# Patient Record
Sex: FEMALE | Race: BLACK OR AFRICAN AMERICAN | NOT HISPANIC OR LATINO | Employment: UNEMPLOYED | URBAN - METROPOLITAN AREA
[De-identification: names, ages, dates, MRNs, and addresses within clinical notes are randomized per-mention and may not be internally consistent; named-entity substitution may affect disease eponyms.]

---

## 2022-05-22 ENCOUNTER — HOSPITAL ENCOUNTER (EMERGENCY)
Facility: HOSPITAL | Age: 22
Discharge: HOME | End: 2022-05-23
Attending: INTERNAL MEDICINE

## 2022-05-22 DIAGNOSIS — R51.9 NONINTRACTABLE HEADACHE, UNSPECIFIED CHRONICITY PATTERN, UNSPECIFIED HEADACHE TYPE: ICD-10-CM

## 2022-05-22 DIAGNOSIS — R50.9 FEVER, UNSPECIFIED FEVER CAUSE: ICD-10-CM

## 2022-05-22 DIAGNOSIS — U07.1 COVID-19: Primary | ICD-10-CM

## 2022-05-22 LAB
AMPHET UR QL SCN: NEGATIVE
APPEARANCE UR: ABNORMAL
B-HCG SERPL QL: NEGATIVE
BACTERIA #/AREA URNS AUTO: ABNORMAL /HPF
BARBITURATE SCN PRESENT UR: NEGATIVE
BENZODIAZ UR QL SCN: NEGATIVE
BILIRUB UR QL STRIP.AUTO: NEGATIVE MG/DL
CANNABINOIDS UR QL SCN: NEGATIVE
COCAINE UR QL SCN: NEGATIVE
COLOR UR AUTO: ABNORMAL
FENTANYL UR QL SCN: NEGATIVE
FLUAV AG UPPER RESP QL IA.RAPID: NOT DETECTED
FLUBV AG UPPER RESP QL IA.RAPID: NOT DETECTED
GLUCOSE UR QL STRIP.AUTO: NEGATIVE MG/DL
KETONES UR QL STRIP.AUTO: 15 MG/DL
LEUKOCYTE ESTERASE UR QL STRIP.AUTO: NEGATIVE UNIT/L
MDMA UR QL SCN: NEGATIVE
MUCOUS THREADS URNS QL MICRO: ABNORMAL /LPF
NITRITE UR QL STRIP.AUTO: POSITIVE
OPIATES UR QL SCN: NEGATIVE
PCP UR QL: NEGATIVE
PH UR STRIP.AUTO: 7 [PH]
PH UR: 7 [PH] (ref 3–11)
PROT UR QL STRIP.AUTO: NEGATIVE MG/DL
RBC #/AREA URNS AUTO: ABNORMAL /HPF
RBC UR QL AUTO: NEGATIVE UNIT/L
RSV A 5' UTR RNA NPH QL NAA+PROBE: NOT DETECTED
SARS-COV-2 RNA RESP QL NAA+PROBE: DETECTED
SP GR UR STRIP.AUTO: >=1.03
SPECIFIC GRAVITY, URINE AUTO (.000) (OHS): >=1.03 (ref 1–1.03)
SQUAMOUS #/AREA URNS AUTO: ABNORMAL /LPF
STREP A PCR (OHS): NOT DETECTED
UROBILINOGEN UR STRIP-ACNC: 1 MG/DL
WBC #/AREA URNS AUTO: ABNORMAL /HPF

## 2022-05-22 PROCEDURE — 87631 RESP VIRUS 3-5 TARGETS: CPT | Performed by: INTERNAL MEDICINE

## 2022-05-22 PROCEDURE — 80307 DRUG TEST PRSMV CHEM ANLYZR: CPT | Performed by: INTERNAL MEDICINE

## 2022-05-22 PROCEDURE — 81025 URINE PREGNANCY TEST: CPT | Performed by: INTERNAL MEDICINE

## 2022-05-22 PROCEDURE — 25000003 PHARM REV CODE 250: Performed by: INTERNAL MEDICINE

## 2022-05-22 PROCEDURE — 81001 URINALYSIS AUTO W/SCOPE: CPT | Performed by: INTERNAL MEDICINE

## 2022-05-22 PROCEDURE — 87636 SARSCOV2 & INF A&B AMP PRB: CPT | Performed by: INTERNAL MEDICINE

## 2022-05-22 PROCEDURE — 99283 EMERGENCY DEPT VISIT LOW MDM: CPT | Mod: 25

## 2022-05-22 RX ORDER — ACETAMINOPHEN 325 MG/1
650 TABLET ORAL
Status: COMPLETED | OUTPATIENT
Start: 2022-05-22 | End: 2022-05-22

## 2022-05-22 RX ADMIN — ACETAMINOPHEN 650 MG: 325 TABLET ORAL at 10:05

## 2022-05-23 VITALS
HEIGHT: 71 IN | SYSTOLIC BLOOD PRESSURE: 128 MMHG | DIASTOLIC BLOOD PRESSURE: 84 MMHG | WEIGHT: 165 LBS | HEART RATE: 87 BPM | TEMPERATURE: 101 F | OXYGEN SATURATION: 97 % | BODY MASS INDEX: 23.1 KG/M2 | RESPIRATION RATE: 18 BRPM

## 2022-05-23 NOTE — ED PROVIDER NOTES
Encounter Date: 5/22/2022       History     Chief Complaint   Patient presents with    Headache    Generalized Body Aches     Symptoms started today. No medication taken at home.      22-year-old black female came into the emergency department after fever and body aches for about 1 hour patient did not take any meds at home just came straight to the emergency department.  Patient works at a [employer] she has not traveled out of town and denies any recent sick contacts        Review of patient's allergies indicates:   Allergen Reactions    Bactrim [sulfamethoxazole-trimethoprim]     Blueberry      No past medical history on file.  Past Surgical History:   Procedure Laterality Date    URINARY SURGERY       No family history on file.  Social History     Substance Use Topics    Alcohol use: Yes     Comment: occasional    Drug use: Never     Review of Systems   Constitutional: Positive for chills, fatigue and fever. Negative for activity change, appetite change, diaphoresis and unexpected weight change.   HENT: Positive for postnasal drip and rhinorrhea. Negative for congestion, dental problem, drooling, ear discharge, ear pain, facial swelling, hearing loss, mouth sores, nosebleeds, sinus pressure, sinus pain, sneezing, sore throat, tinnitus, trouble swallowing and voice change.    Eyes: Negative.  Negative for photophobia, pain, discharge, redness, itching and visual disturbance.   Respiratory: Negative.  Negative for apnea, cough, choking, chest tightness, shortness of breath, wheezing and stridor.    Cardiovascular: Negative.  Negative for chest pain, palpitations and leg swelling.   Gastrointestinal: Negative.  Negative for abdominal distention, abdominal pain, anal bleeding, blood in stool, constipation, diarrhea, nausea, rectal pain and vomiting.   Endocrine: Negative.  Negative for cold intolerance, heat intolerance, polydipsia, polyphagia and polyuria.   Genitourinary: Negative.  Negative for  decreased urine volume, difficulty urinating, dyspareunia, dysuria, enuresis, flank pain, frequency, genital sores, hematuria, menstrual problem, pelvic pain, urgency, vaginal bleeding, vaginal discharge and vaginal pain.   Musculoskeletal: Negative.  Negative for arthralgias, back pain, gait problem, joint swelling, myalgias, neck pain and neck stiffness.   Skin: Negative.  Negative for color change, pallor, rash and wound.   Allergic/Immunologic: Negative.  Negative for environmental allergies, food allergies and immunocompromised state.   Neurological: Positive for light-headedness and headaches. Negative for dizziness, tremors, seizures, syncope, facial asymmetry, speech difficulty, weakness and numbness.   Hematological: Negative.  Negative for adenopathy. Does not bruise/bleed easily.   Psychiatric/Behavioral: Negative.  Negative for agitation, behavioral problems, confusion, decreased concentration, dysphoric mood, hallucinations, self-injury, sleep disturbance and suicidal ideas. The patient is not nervous/anxious and is not hyperactive.    All other systems reviewed and are negative.      Physical Exam     Initial Vitals [05/22/22 2121]   BP Pulse Resp Temp SpO2   136/87 110 16 (!) 100.6 °F (38.1 °C) --      MAP       --         Physical Exam    Nursing note and vitals reviewed.  Constitutional: She appears well-developed and well-nourished. She is not diaphoretic. No distress.   HENT:   Head: Normocephalic and atraumatic.   Mouth/Throat: Oropharynx is clear and moist. No oropharyngeal exudate.   Eyes: Conjunctivae and EOM are normal. Pupils are equal, round, and reactive to light. No scleral icterus.   Neck: Neck supple. No JVD present.   Normal range of motion.  Cardiovascular: Normal rate, regular rhythm, normal heart sounds and intact distal pulses. Exam reveals no gallop and no friction rub.    No murmur heard.  Pulmonary/Chest: Breath sounds normal. No respiratory distress. She has no wheezes. She  exhibits no tenderness.   Abdominal: Abdomen is soft. Bowel sounds are normal. She exhibits no distension. There is no abdominal tenderness. There is no rebound.   Musculoskeletal:         General: Normal range of motion.      Cervical back: Normal range of motion and neck supple.     Neurological: She is alert and oriented to person, place, and time. She has normal strength and normal reflexes.   Skin: Skin is warm and dry. Capillary refill takes less than 2 seconds.   Psychiatric: She has a normal mood and affect. Her behavior is normal. Judgment and thought content normal.         ED Course   Procedures   Admission on 05/22/2022   Component Date Value Ref Range Status    Influenza A PCR 05/22/2022 Not Detected  Not Detected Final    Influenza B PCR 05/22/2022 Not Detected  Not Detected Final    Respiratory Syncytial Virus PCR 05/22/2022 Not Detected  Not Detected Final    SARS-CoV-2 PCR 05/22/2022 Detected (A) Not Detected Final    STREP A PCR ([facility]) 05/22/2022 Not Detected   Final    Color, UA 05/22/2022 Dark Yellow (A) Yellow, Colorless, Other, Clear Final    Appearance, UA 05/22/2022 Cloudy (A) Clear Final    Specific Gravity, UA 05/22/2022 >=1.030   Final    pH, UA 05/22/2022 7.0  5.0, 5.5, 6.0, 6.5, 7.0, 7.5, 8.0, 8.5 Final    Protein, UA 05/22/2022 Negative  Negative, 300  mg/dL Final    Glucose, UA 05/22/2022 Negative  Negative, Normal mg/dL Final    Ketones, UA 05/22/2022 15  (A) Negative, +1, +2, +3, +4, +5, >=160 mg/dL Final    Blood, UA 05/22/2022 Negative  Negative unit/L Final    Bilirubin, UA 05/22/2022 Negative  Negative mg/dL Final    Urobilinogen, UA 05/22/2022 1.0  0.2, 1.0, Normal mg/dL Final    Nitrites, UA 05/22/2022 Positive (A) Negative Final    Leukocyte Esterase, UA 05/22/2022 Negative  Negative, 75  unit/L Final    Amphetamines, Urine 05/22/2022 Negative  Negative Final    Barbituates, Urine 05/22/2022 Negative  Negative Final    Benzodiazepine, Urine 05/22/2022  Negative  Negative Final    Cannabinoids, Urine 05/22/2022 Negative  Negative Final    Cocaine, Urine 05/22/2022 Negative  Negative Final    Fentanyl, Urine 05/22/2022 Negative  Negative Final    Mdma, Urine 05/22/2022 Negative  Negative Final    Opiates, Urine 05/22/2022 Negative  Negative Final    Phencyclidine, Urine 05/22/2022 Negative  Negative Final    pH, Urine 05/22/2022 7.0  3.0 - 11.0 Final    Specific Gravity, Urine Auto 05/22/2022 >=1.030  1.001 - 1.035 Final    Beta hCG Qualitative, Urine 05/22/2022 Negative  Negative Final    Bacteria, UA 05/22/2022 Many (A) None Seen, Rare, Occasional /HPF Final    Mucous, UA 05/22/2022 Small (A) None Seen /LPF Final    RBC, UA 05/22/2022 0-2  None Seen, 0-2, 3-5, 0-5 /HPF Final    WBC, UA 05/22/2022 6-10 (A) None Seen, 0-2, 3-5, 0-5 /HPF Final    Squamous Epithelial Cells, UA 05/22/2022 Few (A) None Seen, Rare, Occasional, Occ /LPF Final       Labs Reviewed   COVID/RSV/FLU A&B PCR - Abnormal; Notable for the following components:       Result Value    SARS-CoV-2 PCR Detected (*)     All other components within normal limits   URINALYSIS, REFLEX TO URINE CULTURE - Abnormal; Notable for the following components:    Color, UA Dark Yellow (*)     Appearance, UA Cloudy (*)     Ketones, UA 15  (*)     Nitrites, UA Positive (*)     All other components within normal limits   URINALYSIS, MICROSCOPIC - Abnormal; Notable for the following components:    Bacteria, UA Many (*)     Mucous, UA Small (*)     WBC, UA 6-10 (*)     Squamous Epithelial Cells, UA Few (*)     All other components within normal limits   DRUG SCREEN PANEL, URINE EMERGENCY - Normal   HCG QUALITATIVE URINE - Normal   STREP GROUP A BY PCR          Imaging Results    None          Medications   acetaminophen tablet 650 mg (650 mg Oral Given 5/22/22 2247)                          Clinical Impression:   Final diagnoses:  [U07.1] COVID-19 (Primary)  [R50.9] Fever, unspecified fever cause  [R51.9]  Nonintractable headache, unspecified chronicity pattern, unspecified headache type       Yamini Watson is a certified MA and was present during the entire interaction with this patient     ED Disposition Condition    Discharge Stable        ED Prescriptions     None        Follow-up Information     Follow up With Specialties Details Why Contact Info    Maura Read NP Family Medicine In 1 week  [address] [place] [address]  [phone]             Michael Vora MD  05/23/22 0016

## 2024-01-21 ENCOUNTER — HOSPITAL ENCOUNTER (EMERGENCY)
Facility: HOSPITAL | Age: 24
Discharge: HOME | End: 2024-01-21
Attending: INTERNAL MEDICINE
Payer: COMMERCIAL

## 2024-01-21 VITALS
WEIGHT: 170 LBS | HEART RATE: 92 BPM | HEIGHT: 71 IN | SYSTOLIC BLOOD PRESSURE: 108 MMHG | RESPIRATION RATE: 17 BRPM | BODY MASS INDEX: 23.8 KG/M2 | DIASTOLIC BLOOD PRESSURE: 70 MMHG | OXYGEN SATURATION: 100 % | TEMPERATURE: 100 F

## 2024-01-21 DIAGNOSIS — R19.7 NAUSEA VOMITING AND DIARRHEA: Primary | ICD-10-CM

## 2024-01-21 DIAGNOSIS — R11.2 NAUSEA VOMITING AND DIARRHEA: Primary | ICD-10-CM

## 2024-01-21 LAB
ALBUMIN SERPL-MCNC: 4.2 G/DL (ref 3.5–5)
ALBUMIN/GLOB SERPL: 1.2 RATIO (ref 1.1–2)
ALP SERPL-CCNC: 56 UNIT/L (ref 40–150)
ALT SERPL-CCNC: 20 UNIT/L (ref 0–55)
APPEARANCE UR: CLEAR
AST SERPL-CCNC: 21 UNIT/L (ref 5–34)
B-HCG SERPL QL: NEGATIVE
BACTERIA #/AREA URNS AUTO: ABNORMAL /HPF
BASOPHILS # BLD AUTO: 0.02 X10(3)/MCL
BASOPHILS NFR BLD AUTO: 0.2 %
BILIRUB SERPL-MCNC: 0.9 MG/DL
BILIRUB UR QL STRIP.AUTO: ABNORMAL
BUN SERPL-MCNC: 17 MG/DL (ref 7–18.7)
CALCIUM SERPL-MCNC: 9.3 MG/DL (ref 8.4–10.2)
CHLORIDE SERPL-SCNC: 107 MMOL/L (ref 98–107)
CO2 SERPL-SCNC: 23 MMOL/L (ref 22–29)
COLOR UR AUTO: YELLOW
CREAT SERPL-MCNC: 0.83 MG/DL (ref 0.55–1.02)
EOSINOPHIL # BLD AUTO: 0.09 X10(3)/MCL (ref 0–0.9)
EOSINOPHIL NFR BLD AUTO: 1 %
ERYTHROCYTE [DISTWIDTH] IN BLOOD BY AUTOMATED COUNT: 16.2 % (ref 11.5–17)
FLUAV AG UPPER RESP QL IA.RAPID: NOT DETECTED
FLUBV AG UPPER RESP QL IA.RAPID: NOT DETECTED
GFR SERPLBLD CREATININE-BSD FMLA CKD-EPI: >60 MLS/MIN/1.73/M2
GLOBULIN SER-MCNC: 3.5 GM/DL (ref 2.4–3.5)
GLUCOSE SERPL-MCNC: 96 MG/DL (ref 74–100)
GLUCOSE UR QL STRIP.AUTO: NEGATIVE
HCT VFR BLD AUTO: 37.5 % (ref 37–47)
HGB BLD-MCNC: 13 G/DL (ref 12–16)
IMM GRANULOCYTES # BLD AUTO: 0.03 X10(3)/MCL (ref 0–0.04)
IMM GRANULOCYTES NFR BLD AUTO: 0.3 %
KETONES UR QL STRIP.AUTO: ABNORMAL
LEUKOCYTE ESTERASE UR QL STRIP.AUTO: NEGATIVE
LYMPHOCYTES # BLD AUTO: 0.53 X10(3)/MCL (ref 0.6–4.6)
LYMPHOCYTES NFR BLD AUTO: 5.8 %
MCH RBC QN AUTO: 26.6 PG (ref 27–31)
MCHC RBC AUTO-ENTMCNC: 34.7 G/DL (ref 33–36)
MCV RBC AUTO: 76.8 FL (ref 80–94)
MONOCYTES # BLD AUTO: 0.23 X10(3)/MCL (ref 0.1–1.3)
MONOCYTES NFR BLD AUTO: 2.5 %
MUCOUS THREADS URNS QL MICRO: ABNORMAL /LPF
NEUTROPHILS # BLD AUTO: 8.26 X10(3)/MCL (ref 2.1–9.2)
NEUTROPHILS NFR BLD AUTO: 90.2 %
NITRITE UR QL STRIP.AUTO: NEGATIVE
PH UR STRIP.AUTO: 6 [PH]
PLATELET # BLD AUTO: 261 X10(3)/MCL (ref 130–400)
PMV BLD AUTO: 10.5 FL (ref 7.4–10.4)
POTASSIUM SERPL-SCNC: 3.7 MMOL/L (ref 3.5–5.1)
PROT SERPL-MCNC: 7.7 GM/DL (ref 6.4–8.3)
PROT UR QL STRIP.AUTO: ABNORMAL
RBC # BLD AUTO: 4.88 X10(6)/MCL (ref 4.2–5.4)
RBC #/AREA URNS AUTO: ABNORMAL /HPF
RBC UR QL AUTO: ABNORMAL
SARS-COV-2 RNA RESP QL NAA+PROBE: NOT DETECTED
SODIUM SERPL-SCNC: 139 MMOL/L (ref 136–145)
SP GR UR STRIP.AUTO: 1.02 (ref 1–1.03)
SQUAMOUS #/AREA URNS AUTO: ABNORMAL /HPF
UROBILINOGEN UR STRIP-ACNC: 0.2
WBC # SPEC AUTO: 9.16 X10(3)/MCL (ref 4.5–11.5)
WBC #/AREA URNS AUTO: ABNORMAL /HPF

## 2024-01-21 PROCEDURE — 99284 EMERGENCY DEPT VISIT MOD MDM: CPT | Mod: 25

## 2024-01-21 PROCEDURE — 0240U COVID/FLU A&B PCR: CPT | Performed by: PHYSICIAN ASSISTANT

## 2024-01-21 PROCEDURE — 81025 URINE PREGNANCY TEST: CPT | Performed by: PHYSICIAN ASSISTANT

## 2024-01-21 PROCEDURE — 85025 COMPLETE CBC W/AUTO DIFF WBC: CPT | Performed by: PHYSICIAN ASSISTANT

## 2024-01-21 PROCEDURE — 81003 URINALYSIS AUTO W/O SCOPE: CPT | Performed by: PHYSICIAN ASSISTANT

## 2024-01-21 PROCEDURE — 96360 HYDRATION IV INFUSION INIT: CPT

## 2024-01-21 PROCEDURE — 80053 COMPREHEN METABOLIC PANEL: CPT | Performed by: PHYSICIAN ASSISTANT

## 2024-01-21 PROCEDURE — 25000003 PHARM REV CODE 250: Performed by: PHYSICIAN ASSISTANT

## 2024-01-21 RX ORDER — ONDANSETRON 4 MG/1
4 TABLET, ORALLY DISINTEGRATING ORAL EVERY 8 HOURS PRN
Qty: 20 TABLET | Refills: 0 | Status: SHIPPED | OUTPATIENT
Start: 2024-01-21

## 2024-01-21 RX ORDER — ONDANSETRON 4 MG/1
4 TABLET, ORALLY DISINTEGRATING ORAL
Status: COMPLETED | OUTPATIENT
Start: 2024-01-21 | End: 2024-01-21

## 2024-01-21 RX ADMIN — ONDANSETRON 4 MG: 4 TABLET, ORALLY DISINTEGRATING ORAL at 05:01

## 2024-01-21 RX ADMIN — SODIUM CHLORIDE 1000 ML: 9 INJECTION, SOLUTION INTRAVENOUS at 05:01

## 2024-01-21 NOTE — Clinical Note
"Annie Gonzales"Logan was seen and treated in our emergency department on 1/21/2024.  She may return to work on 01/23/2024.       If you have any questions or concerns, please don't hesitate to call.      REY Kelly    "

## 2024-01-22 NOTE — ED PROVIDER NOTES
Encounter Date: 1/21/2024       History     Chief Complaint   Patient presents with    Emesis     C/o n/v/d and weakness since last night. Also reports body aches.     24 yo female presents to ED for evaluation of nausea, vomiting and diarrhea since last night. Denies any abdominal pain. Patient reports to having normal BM prior to yesterday. Denies any urinary complaints or fever.       The history is provided by the patient. No  was used.     Review of patient's allergies indicates:   Allergen Reactions    Bactrim [sulfamethoxazole-trimethoprim]     Blueberry      History reviewed. No pertinent past medical history.  Past Surgical History:   Procedure Laterality Date    URINARY SURGERY       History reviewed. No pertinent family history.  Social History     Tobacco Use    Smoking status: Never    Smokeless tobacco: Never   Substance Use Topics    Alcohol use: Yes     Comment: occasional    Drug use: Never     Review of Systems   Constitutional:  Negative for chills, fatigue and fever.   Respiratory:  Negative for shortness of breath.    Cardiovascular:  Negative for chest pain.   Gastrointestinal:  Positive for diarrhea, nausea and vomiting. Negative for abdominal pain.   Genitourinary:  Negative for dysuria, flank pain, frequency, pelvic pain and urgency.   Musculoskeletal:  Negative for myalgias.   All other systems reviewed and are negative.      Physical Exam     Initial Vitals [01/21/24 1601]   BP Pulse Resp Temp SpO2   123/85 (!) 120 17 99.6 °F (37.6 °C) 98 %      MAP       --         Physical Exam    Nursing note and vitals reviewed.  Constitutional: She appears well-developed and well-nourished.   HENT:   Head: Normocephalic and atraumatic.   Right Ear: Tympanic membrane and external ear normal.   Left Ear: Tympanic membrane and external ear normal.   Mouth/Throat: Uvula is midline, oropharynx is clear and moist and mucous membranes are normal. No trismus in the jaw. No uvula swelling.  No oropharyngeal exudate, posterior oropharyngeal edema or posterior oropharyngeal erythema.   Eyes: Conjunctivae are normal. Pupils are equal, round, and reactive to light.   Neck: Neck supple.   Normal range of motion.  Cardiovascular:  Normal rate, regular rhythm and normal heart sounds.           Pulmonary/Chest: Breath sounds normal. She has no wheezes. She has no rhonchi. She has no rales.   Abdominal: Abdomen is soft. Bowel sounds are normal. There is no abdominal tenderness. There is no rebound and no guarding.   Musculoskeletal:         General: Normal range of motion.      Cervical back: Normal range of motion and neck supple.     Neurological: She is alert and oriented to person, place, and time.   Skin: Skin is warm and dry.   Psychiatric: She has a normal mood and affect.         ED Course   Procedures  Labs Reviewed   URINALYSIS, REFLEX TO URINE CULTURE - Abnormal; Notable for the following components:       Result Value    Protein, UA Trace (*)     Ketones, UA Trace (*)     Blood, UA 3+ (*)     Bilirubin, UA 1+ (*)     All other components within normal limits   CBC WITH DIFFERENTIAL - Abnormal; Notable for the following components:    MCV 76.8 (*)     MCH 26.6 (*)     MPV 10.5 (*)     Lymph # 0.53 (*)     All other components within normal limits   URINALYSIS, MICROSCOPIC - Abnormal; Notable for the following components:    Bacteria, UA Few (*)     Mucous, UA Moderate (*)     Squamous Epithelial Cells, UA Moderate (*)     All other components within normal limits   PREGNANCY TEST, URINE RAPID - Normal   COVID/FLU A&B PCR - Normal    Narrative:     The Xpert Xpress SARS-CoV-2/FLU/RSV plus is a rapid, multiplexed real-time PCR test intended for the simultaneous qualitative detection and differentiation of SARS-CoV-2, Influenza A, Influenza B, and respiratory syncytial virus (RSV) viral RNA in either nasopharyngeal swab or nasal swab specimens.         CBC W/ AUTO DIFFERENTIAL    Narrative:     The  following orders were created for panel order CBC auto differential.  Procedure                               Abnormality         Status                     ---------                               -----------         ------                     CBC with Differential[[id number]]        Abnormal            Final result                 Please view results for these tests on the individual orders.   COMPREHENSIVE METABOLIC PANEL          Imaging Results    None          Medications   ondansetron disintegrating tablet 4 mg (4 mg Oral Given 1/21/24 1716)   sodium chloride 0.9% bolus 1,000 mL 1,000 mL (1,000 mLs Intravenous New Bag 1/21/24 1716)     Medical Decision Making  22 yo female presents to ED for evaluation of nausea, vomiting and diarrhea since last night. Denies any abdominal pain. Patient reports to having normal BM prior to yesterday. Denies any urinary complaints or fever.     Differential Diagnosis includes, but is not limited to:  SBO/volvulus, incarcerated/strangulated hernia, intussusception, ileus, appendicitis, cholecystitis, cholangitis, diverticulitis, esophagitis, hepatitis, nephrolithiasis, pancreatitis, gastroenteritis, colitis, IBD/IBS, biliary colic, GERD, PUD, constipation, UTI/pyelonephritis,  disorder.      Patient afebrile and in no acute distress.  Abdomen is soft nontender nonsurgical.  Planes of nausea vomiting and diarrhea since last night.  Discussed likely viral syndrome, patient in agreement.  COVID and flu negative.  Labs unremarkable.  Patient able to tolerate fluids after Zofran will discharge home with short course of Zofran Discussed symptomatic care of viral syndrome.      Amount and/or Complexity of Data Reviewed  Labs: ordered.    Risk  OTC drugs.  Prescription drug management.                                      Clinical Impression:  Final diagnoses:  [R11.2, R19.7] Nausea vomiting and diarrhea (Primary)          ED Disposition Condition    Discharge Stable          ED  Prescriptions       Medication Sig Dispense Start Date End Date Auth. Provider    ondansetron (ZOFRAN-ODT) 4 MG TbDL Take 1 tablet (4 mg total) by mouth every 8 (eight) hours as needed. 20 tablet 1/21/2024 -- Larissa Coto PA          Follow-up Information       Follow up With Specialties Details Why Contact Info    Maura Read NP Family Medicine   [address] [place] [address] [address] [place] [id number] [phone]               Larissa Coto PA  01/21/24 1596

## 2024-01-22 NOTE — DISCHARGE INSTRUCTIONS
Drink plenty of fluids. Use zofran for nausea and vomiting. Take ibuprofen and tylenol as needed. Eat a bland diet over the next 2-3 days.  Reported anything spicy, fried, or lots of dairy products.

## 2024-08-28 ENCOUNTER — HOSPITAL ENCOUNTER (EMERGENCY)
Facility: HOSPITAL | Age: 24
Discharge: HOME | End: 2024-08-28
Attending: INTERNAL MEDICINE
Payer: COMMERCIAL

## 2024-08-28 VITALS
HEIGHT: 71 IN | SYSTOLIC BLOOD PRESSURE: 112 MMHG | TEMPERATURE: 100 F | DIASTOLIC BLOOD PRESSURE: 77 MMHG | BODY MASS INDEX: 24.95 KG/M2 | HEART RATE: 95 BPM | WEIGHT: 178.19 LBS | OXYGEN SATURATION: 99 % | RESPIRATION RATE: 16 BRPM

## 2024-08-28 DIAGNOSIS — J06.9 VIRAL URI: Primary | ICD-10-CM

## 2024-08-28 LAB
FLUAV AG UPPER RESP QL IA.RAPID: NOT DETECTED
FLUBV AG UPPER RESP QL IA.RAPID: NOT DETECTED
SARS-COV-2 RNA RESP QL NAA+PROBE: NOT DETECTED

## 2024-08-28 PROCEDURE — 0240U COVID/FLU A&B PCR: CPT

## 2024-08-28 PROCEDURE — 99283 EMERGENCY DEPT VISIT LOW MDM: CPT

## 2024-08-28 PROCEDURE — 25000003 PHARM REV CODE 250

## 2024-08-28 RX ORDER — ACETAMINOPHEN 500 MG
1000 TABLET ORAL
Status: COMPLETED | OUTPATIENT
Start: 2024-08-28 | End: 2024-08-28

## 2024-08-28 RX ADMIN — ACETAMINOPHEN 1000 MG: 500 TABLET, FILM COATED ORAL at 08:08

## 2024-08-29 NOTE — DISCHARGE INSTRUCTIONS
Recommend taking over the counter cold/ flu medications for relief.    Continue to monitor fever and take Tylenol as needed. OK to alternate doses with Ibuprofen.    Return to ER for worsening symptoms.

## 2024-08-29 NOTE — ED PROVIDER NOTES
Encounter Date: 8/28/2024       History     Chief Complaint   Patient presents with    Fever     Patient c/o new onset fever and body aches from today.     See MDM for details     The history is provided by the patient.     Review of patient's allergies indicates:   Allergen Reactions    Bactrim [sulfamethoxazole-trimethoprim]     Blueberry      History reviewed. No pertinent past medical history.  Past Surgical History:   Procedure Laterality Date    URINARY SURGERY       No family history on file.  Social History     Tobacco Use    Smoking status: Never    Smokeless tobacco: Never   Substance Use Topics    Alcohol use: Yes     Comment: occasional    Drug use: Never     Review of Systems   Constitutional:  Positive for fatigue and fever. Negative for chills.   HENT:  Positive for congestion. Negative for ear pain and sore throat.    Respiratory:  Positive for cough. Negative for shortness of breath.    Cardiovascular:  Negative for chest pain.   Gastrointestinal:  Negative for abdominal pain, nausea and vomiting.   Genitourinary:  Negative for dysuria, flank pain, frequency and hematuria.   All other systems reviewed and are negative.      Physical Exam     Initial Vitals [08/28/24 1943]   BP Pulse Resp Temp SpO2   114/78 97 16 (!) 101.5 °F (38.6 °C) 99 %      MAP       --         Physical Exam    Nursing note and vitals reviewed.  Constitutional: She appears well-developed and well-nourished. No distress.   HENT:   Head: Normocephalic and atraumatic.   Right Ear: Tympanic membrane and ear canal normal.   Left Ear: Tympanic membrane and ear canal normal.   Nose: Mucosal edema and rhinorrhea present.   Mouth/Throat: Uvula is midline and mucous membranes are normal. No oropharyngeal exudate, posterior oropharyngeal edema or posterior oropharyngeal erythema.   Eyes: Conjunctivae and EOM are normal.   Neck:   Normal range of motion.  Cardiovascular:  Normal rate, regular rhythm and normal heart sounds.            Pulmonary/Chest: Breath sounds normal. No respiratory distress. She has no wheezes. She has no rhonchi.   Musculoskeletal:         General: Normal range of motion.      Cervical back: Normal range of motion.     Neurological: She is alert and oriented to person, place, and time.   Skin: Skin is warm and dry.   Psychiatric: She has a normal mood and affect. Thought content normal.         ED Course   Procedures  Labs Reviewed   COVID/FLU A&B PCR - Normal       Result Value    Influenza A PCR Not Detected      Influenza B PCR Not Detected      SARS-CoV-2 PCR Not Detected      Narrative:     The Xpert Xpress SARS-CoV-2/FLU/RSV plus is a rapid, multiplexed real-time PCR test intended for the simultaneous qualitative detection and differentiation of SARS-CoV-2, Influenza A, Influenza B, and respiratory syncytial virus (RSV) viral RNA in either nasopharyngeal swab or nasal swab specimens.                Imaging Results    None          Medications   acetaminophen tablet 1,000 mg (1,000 mg Oral Given 8/28/24 2011)     Medical Decision Making  24 year old female presents to the ER for evaluation of fever, generalized body aches, and congestion x 1 day. Patient reports onset of symptoms today. She reports subjective fever, temp 101.5 in triage. She reports only taking Tylenol today which was given in the ER. She reports congestion and dry cough. Denies any sore throat, abdominal pain, nausea/ vomiting, dysuria, flank pain, etc. She denies any other symptoms today. She denies any known sick contacts.    Patient only has upper respiratory symptoms at this time. COVID/ flu negative today. Suspect viral URI with cough. Recommend over the counter cold/ flu medications. Recommend she continues to monitor for fever and take Tylenol/ Ibuprofen as needed. Recommend she return to ER with any worsening symptoms. She verbalized understanding.     Amount and/or Complexity of Data Reviewed  Labs: ordered. Decision-making details  documented in ED Course.    Risk  OTC drugs.      Additional MDM:   Differential Diagnosis:   Other: The following diagnoses were also considered and will be evaluated: COVID, viral syndrome and strep pharyngitis.            ED Course as of 08/28/24 2059   Wed Aug 28, 2024   2030 Flu/ COVID negative [LM]      ED Course User Index  [LM] Betzaida Bustos PA                           Clinical Impression:  Final diagnoses:  [J06.9] Viral URI (Primary)          ED Disposition Condition    Discharge Stable          ED Prescriptions    None       Follow-up Information       Follow up With Specialties Details Why Contact Info    Maura Read NP Family Medicine Schedule an appointment as soon as possible for a visit  As needed, If symptoms worsen [address] [place] [id number]  [phone]               Betzaida Bustos PA  08/28/24 2059